# Patient Record
Sex: FEMALE | Race: OTHER | NOT HISPANIC OR LATINO | Employment: FULL TIME | URBAN - METROPOLITAN AREA
[De-identification: names, ages, dates, MRNs, and addresses within clinical notes are randomized per-mention and may not be internally consistent; named-entity substitution may affect disease eponyms.]

---

## 2023-05-26 ENCOUNTER — OCCMED (OUTPATIENT)
Dept: URGENT CARE | Facility: CLINIC | Age: 33
End: 2023-05-26

## 2023-05-26 DIAGNOSIS — Z02.1 PRE-EMPLOYMENT HEALTH SCREENING EXAMINATION: Primary | ICD-10-CM

## 2023-05-26 LAB
MEV IGG SER QL IA: NORMAL
MUV IGG SER QL IA: NORMAL
RUBV IGG SERPL IA-ACNC: 79.4 IU/ML
VZV IGG SER QL IA: NORMAL

## 2023-05-26 NOTE — PROGRESS NOTES
This encounter was created for [facility] orders only   [facility]        NAME: Damaris Us is a 33 y o  female  : 1990    MRN: [id number]  DATE: May 26, 2023  TIME: 12:53 PM    There were no vitals taken for this visit  Assessment and Plan   Pre-employment health screening examination [Z02 1]  1  Pre-employment health screening examination  Mumps antibody, IgG    Rubeola antibody IgG    Rubella antibody, IgG    Varicella zoster antibody, IgG    Quantiferon TB Gold Plus            Patient Instructions       Follow up with PCP in 3-5 days  Proceed to  ER if symptoms worsen  Chief Complaint   No chief complaint on file  History of Present Illness       HPI    Review of Systems   Review of Systems      Current Medications     No current outpatient medications on file  Current Allergies     Allergies as of 2023   • (Not on File)            The following portions of the patient's history were reviewed and updated as appropriate: allergies, current medications, past family history, past medical history, past social history, past surgical history and problem list      No past medical history on file  No past surgical history on file  No family history on file  Medications have been verified  Objective   There were no vitals taken for this visit         Physical Exam     Physical Exam

## 2023-05-28 LAB
GAMMA INTERFERON BACKGROUND BLD IA-ACNC: 0.04 IU/ML
M TB IFN-G BLD-IMP: NEGATIVE
M TB IFN-G CD4+ BCKGRND COR BLD-ACNC: -0.01 IU/ML
M TB IFN-G CD4+ BCKGRND COR BLD-ACNC: -0.01 IU/ML
MITOGEN IGNF BCKGRD COR BLD-ACNC: 9.85 IU/ML